# Patient Record
Sex: MALE | Race: BLACK OR AFRICAN AMERICAN | Employment: UNEMPLOYED | URBAN - NONMETROPOLITAN AREA
[De-identification: names, ages, dates, MRNs, and addresses within clinical notes are randomized per-mention and may not be internally consistent; named-entity substitution may affect disease eponyms.]

---

## 2021-08-13 ENCOUNTER — IMMUNIZATION (OUTPATIENT)
Dept: FAMILY MEDICINE | Facility: OTHER | Age: 31
End: 2021-08-13
Attending: FAMILY MEDICINE
Payer: OTHER GOVERNMENT

## 2021-08-13 PROCEDURE — 0001A PR COVID VAC PFIZER DIL RECON 30 MCG/0.3 ML IM: CPT

## 2021-08-13 PROCEDURE — 91300 PR COVID VAC PFIZER DIL RECON 30 MCG/0.3 ML IM: CPT

## 2021-09-03 ENCOUNTER — IMMUNIZATION (OUTPATIENT)
Dept: FAMILY MEDICINE | Facility: OTHER | Age: 31
End: 2021-09-03
Attending: FAMILY MEDICINE
Payer: OTHER GOVERNMENT

## 2021-09-03 PROCEDURE — 0002A PR COVID VAC PFIZER DIL RECON 30 MCG/0.3 ML IM: CPT

## 2021-09-03 PROCEDURE — 91300 PR COVID VAC PFIZER DIL RECON 30 MCG/0.3 ML IM: CPT

## 2023-07-30 ENCOUNTER — HOSPITAL ENCOUNTER (OUTPATIENT)
Dept: GENERAL RADIOLOGY | Facility: OTHER | Age: 33
Discharge: HOME | End: 2023-07-30

## 2023-07-30 DIAGNOSIS — S99.911A ANKLE INJURY, RIGHT, INITIAL ENCOUNTER: ICD-10-CM

## 2023-07-30 PROCEDURE — 73610 X-RAY EXAM OF ANKLE: CPT | Mod: RT

## 2023-07-31 ENCOUNTER — HOSPITAL ENCOUNTER (OUTPATIENT)
Dept: GENERAL RADIOLOGY | Facility: OTHER | Age: 33
Discharge: HOME | End: 2023-07-31
Attending: FAMILY MEDICINE

## 2023-07-31 ENCOUNTER — OFFICE VISIT (OUTPATIENT)
Dept: FAMILY MEDICINE | Facility: OTHER | Age: 33
End: 2023-07-31
Attending: FAMILY MEDICINE

## 2023-07-31 VITALS
SYSTOLIC BLOOD PRESSURE: 130 MMHG | DIASTOLIC BLOOD PRESSURE: 78 MMHG | WEIGHT: 185.19 LBS | HEART RATE: 110 BPM | OXYGEN SATURATION: 98 % | RESPIRATION RATE: 16 BRPM | TEMPERATURE: 98 F

## 2023-07-31 DIAGNOSIS — S82.431A CLOSED DISPLACED OBLIQUE FRACTURE OF SHAFT OF RIGHT FIBULA, INITIAL ENCOUNTER: ICD-10-CM

## 2023-07-31 DIAGNOSIS — S93.431A SYNDESMOTIC DISRUPTION OF RIGHT ANKLE, INITIAL ENCOUNTER: Primary | ICD-10-CM

## 2023-07-31 PROCEDURE — 73590 X-RAY EXAM OF LOWER LEG: CPT | Mod: RT

## 2023-07-31 PROCEDURE — 99214 OFFICE O/P EST MOD 30 MIN: CPT | Performed by: FAMILY MEDICINE

## 2023-07-31 ASSESSMENT — PAIN SCALES - GENERAL: PAINLEVEL: MODERATE PAIN (5)

## 2023-07-31 NOTE — H&P (VIEW-ONLY)
Sports Medicine Office Note    HPI:  33-year-old male coming in for evaluation of a right ankle injury that occurred on 7/29.  He was playing recreational softball with family and friends [place] a [place] and hurt his ankle sliding into second base.  He does not recall the exact mechanism of the injury.  He heard a loud pop when this occurred and had immediate pain and difficulty bearing weight.  He subsequently developed swelling.  He was seen in the ER the next day and was found to have ankle x-ray showing a disrupted mortise.  He has been nonweightbearing in a splint.  He comes in today for follow-up.  He currently rates his pain at 9/10.  He has been using ice to help with pain and swelling.  He has a history of an ankle sprain 14 years ago.      33-year-old male with no significant past medical history coming in for preoperative clearance. They plan to undergo right ankle surgery with Dr. High on 8/3. No history of previous surgical complications. No history of blood clot or PE.  No personal or family history of problems with anesthesia.  No history of coronary artery disease, stroke, congestive heart failure, or kidney disease.  Patient feels like they would be able to walk up multiple flight(s) of stairs before needing to stop.  Patient feels like they would be able to walk many city blocks before needing to stop.    Revised Flannery Cardiac Risk index (RCRI)  6 predictors of cardiac complications:  -High risk type of surgery (examples include vascular surgery and any open intraperitoneal or intrathoracic procedures):  No  -History of ischemic heart disease (history of MI or positive exercise test, current complaint of chest pain considered to be secondary to myocardial ischemia, use of nitrate therapy, or EKG with pathologic Q waves; do not count prior coronary revascularization procedure and less 1 of the other criteria for ischemic heart disease is present):  No  -History of heart failure:   No  -History of cerebrovascular disease:  No  -Diabetes mellitus requiring treatment with insulin:  No  -Preoperative serum creatinine greater than 2.0 mg/dL:  No  30-day risk of death, MI, or cardiac arrest following surgery:  3.9%    Daryl Perioperative Cardiac Risk:  -Age:  33  -Creatinine: <1.5mg/dL  -ASA class:  ASA 1 (Normal healthy patient)  -Perioperative functional status:  Total independent  -Procedure site:  Orthopedic and nonvascular extremity  Estimated risk probability for perioperative myocardial infarction or cardiac arrest:  0.0%    MET evaluation:  -Can take care of self, such as eat, dress, or use the toilet (1 MET)  -Can walk up a flight of stairs or a hill or walk on level ground at 3-4 mph (4 METs)  -Can do heavy work around the house such as scrubbing floors or lifting or moving heavy furniture or climbing 2 flights of stairs (between 4-10 METs)  -Can participate in strenuous sports such as swimming, single tennis, football, basketball, and skiing (greater than 10 METs)  MET score:  10+    Symptoms/history:  -Symptoms of angina, dyspnea, syncope, and/or palpitations:  No  -History of ischemia, vascular disease, myopathic disease:  No  -History of diabetes, chronic kidney disease, CVD, PAD:  No  -NSAID use:  No  -Symptoms of SUNDEEP:  No  -Alcohol misuse: 12 beverages per week  -Illicit drug use:  No  -Smoking:  Yes - several cigarettes per day  -Personal or family history of anesthetic complications:  No    EXAM:  /78 (BP Location: Right arm, Patient Position: Sitting, Cuff Size: Adult Regular)   Pulse 110   Temp 98  F (36.7  C) (Temporal)   Resp 16   Wt 84 kg (185 lb 3 oz)   SpO2 98%   General: No acute distress  Neck: Supple, no lymphadenopathy  CV: Regular rate and rhythm without murmur, pulses intact in the lower extremities  Respiratory: Normal effort, talking in full sentences, no wheezes, rales, or rhonchi, normal respiratory rate  Abdomen: NABS, nontender, no  hepatosplenomegaly  Psych: Normal mood and affect    MUSCULOSKELETAL EXAM:  RIGHT FOOT AND ANKLE  Inspection:  -No gross deformity  - Significant swelling about the lower leg, ankle, and foot  - Mild bruising about the ankle  -Scars: Multiple tattoos throughout the lower extremity    Tenderness:  - Diffusely tender throughout the lower leg, ankle, and proximal foot    Range of Motion:  - Limited due to pain    Strength:  -Dorsiflexion:  4-/5  -Plantarflexion:  4-/5  -Inversion:  3/5  -Eversion:  3/5    Other:  -Intact sensation to light touch distally.  -No signs of cyanosis. Normal skin temperature.  -Knee:  No gross deformity. Normal motion.  -Left foot/ankle:  No gross deformity. No palpable tenderness. Normal strength.      IMAGIN2023: 3 view right ankle x-ray  - Large mortise disruption of the right ankle.  Small osseous fragment resting between the medial malleolus and the medial talus which likely represents a small avulsion injury.  No other acute bony abnormalities.    2023: 2 view right tibia/fibula x-ray  - Mildly displaced midshaft fibula fracture.  Mortise disruption is again seen at the ankle.      ASSESSMENT/PLAN:  Diagnoses and all orders for this visit:  Syndesmotic disruption of right ankle, initial encounter  -     Crutches Order for DME - ONLY FOR DME  -     WALKING BOOT - SUMMER  Closed displaced oblique fracture of shaft of right fibula, initial encounter  -     Crutches Order for DME - ONLY FOR DME  -     WALKING BOOT - SUMMER    33-year-old male with right ankle mortise disruption and a Maisonneuve fracture.  X-rays from  and  were both personally reviewed in the office with the findings as demonstrated above by my interpretation.  This is an injury pattern that necessitates surgical intervention.  This was explained in great detail with the patient and mother-in-law who was present during the visit.  - Walking boot  - Patient to be nonweightbearing on crutches  - Preop  physical performed in the office today (see below)  - Arrange for patient to have surgery on 8/3 with Dr. High  - Orthopedics to assume care      33-year-old male coming in for preoperative clearance for right ankle surgery with Dr. High on 8/3.  No indication for labs, EKG, and chest x-ray.  Patient has a normal exam.  RCRI risk assessment of 3.9% and Brito Perioperative Cardiac Risk assessment of 0.0%.  Based on my evaluation today, I feel this patient is at low risk for complications from orthopedic surgery.  -Follow-up with Dr. High as planned prior to the procedure to go over final details of the procedure  -Medications to stop and the length of time for them to be stopped prior to surgery will be determined by Dr. High/surgery team.      Lewis Gann MD  7/31/2023  11:15 AM    Total time spent with this patient was 46 minutes which included chart review, visualization and independent interpretation of images, time spent with the patient, and documentation.    Procedure time:  0 minute(s)

## 2023-08-02 ENCOUNTER — ANESTHESIA EVENT (OUTPATIENT)
Dept: SURGERY | Facility: OTHER | Age: 33
End: 2023-08-02

## 2023-08-03 ENCOUNTER — HOSPITAL ENCOUNTER (OUTPATIENT)
Facility: OTHER | Age: 33
Discharge: HOME | End: 2023-08-03
Attending: PODIATRIST | Admitting: PODIATRIST

## 2023-08-03 ENCOUNTER — ANESTHESIA (OUTPATIENT)
Dept: SURGERY | Facility: OTHER | Age: 33
End: 2023-08-03

## 2023-08-03 ENCOUNTER — HOSPITAL ENCOUNTER (OUTPATIENT)
Dept: GENERAL RADIOLOGY | Facility: OTHER | Age: 33
Discharge: HOME | End: 2023-08-03
Attending: PODIATRIST | Admitting: PODIATRIST

## 2023-08-03 VITALS
HEART RATE: 66 BPM | SYSTOLIC BLOOD PRESSURE: 108 MMHG | TEMPERATURE: 98.5 F | OXYGEN SATURATION: 97 % | RESPIRATION RATE: 16 BRPM | BODY MASS INDEX: 30.82 KG/M2 | WEIGHT: 185 LBS | DIASTOLIC BLOOD PRESSURE: 89 MMHG | HEIGHT: 65 IN

## 2023-08-03 DIAGNOSIS — G89.18 POST-OP PAIN: Primary | ICD-10-CM

## 2023-08-03 DIAGNOSIS — S93.431A SYNDESMOTIC DISRUPTION OF RIGHT ANKLE, INITIAL ENCOUNTER: ICD-10-CM

## 2023-08-03 PROCEDURE — 360N000084 HC SURGERY LEVEL 4 W/ FLUORO, PER MIN: Performed by: PODIATRIST

## 2023-08-03 PROCEDURE — 272N000001 HC OR GENERAL SUPPLY STERILE: Performed by: PODIATRIST

## 2023-08-03 PROCEDURE — 27784 TREATMENT OF FIBULA FRACTURE: CPT | Mod: RT | Performed by: PODIATRIST

## 2023-08-03 PROCEDURE — 250N000009 HC RX 250: Performed by: NURSE ANESTHETIST, CERTIFIED REGISTERED

## 2023-08-03 PROCEDURE — 250N000011 HC RX IP 250 OP 636: Performed by: NURSE ANESTHETIST, CERTIFIED REGISTERED

## 2023-08-03 PROCEDURE — 258N000003 HC RX IP 258 OP 636

## 2023-08-03 PROCEDURE — 250N000011 HC RX IP 250 OP 636: Mod: JZ | Performed by: NURSE ANESTHETIST, CERTIFIED REGISTERED

## 2023-08-03 PROCEDURE — 250N000011 HC RX IP 250 OP 636

## 2023-08-03 PROCEDURE — 710N000012 HC RECOVERY PHASE 2, PER MINUTE: Performed by: PODIATRIST

## 2023-08-03 PROCEDURE — 278N000051 HC OR IMPLANT GENERAL: Performed by: PODIATRIST

## 2023-08-03 PROCEDURE — 27829 TREAT LOWER LEG JOINT: CPT | Mod: RT | Performed by: PODIATRIST

## 2023-08-03 PROCEDURE — 999N000141 HC STATISTIC PRE-PROCEDURE NURSING ASSESSMENT: Performed by: PODIATRIST

## 2023-08-03 PROCEDURE — 999N000180 XR SURGERY CARM FLUORO LESS THAN 5 MIN: Mod: TC

## 2023-08-03 PROCEDURE — 250N000013 HC RX MED GY IP 250 OP 250 PS 637

## 2023-08-03 PROCEDURE — 250N000011 HC RX IP 250 OP 636: Performed by: PODIATRIST

## 2023-08-03 PROCEDURE — 250N000009 HC RX 250: Performed by: PODIATRIST

## 2023-08-03 PROCEDURE — 64445 NJX AA&/STRD SCIATIC NRV IMG: CPT | Mod: RT | Performed by: NURSE ANESTHETIST, CERTIFIED REGISTERED

## 2023-08-03 PROCEDURE — 370N000017 HC ANESTHESIA TECHNICAL FEE, PER MIN: Performed by: PODIATRIST

## 2023-08-03 PROCEDURE — 250N000013 HC RX MED GY IP 250 OP 250 PS 637: Performed by: NURSE ANESTHETIST, CERTIFIED REGISTERED

## 2023-08-03 PROCEDURE — C1713 ANCHOR/SCREW BN/BN,TIS/BN: HCPCS | Performed by: PODIATRIST

## 2023-08-03 PROCEDURE — 64447 NJX AA&/STRD FEMORAL NRV IMG: CPT | Mod: RT | Performed by: NURSE ANESTHETIST, CERTIFIED REGISTERED

## 2023-08-03 DEVICE — IMPLANTABLE DEVICE: Type: IMPLANTABLE DEVICE | Site: ANKLE | Status: FUNCTIONAL

## 2023-08-03 DEVICE — IMP PLATE ARTHREX LOCKING 1/3 TUBULAR 10H SS AR-8943T-10: Type: IMPLANTABLE DEVICE | Site: ANKLE | Status: FUNCTIONAL

## 2023-08-03 RX ORDER — SODIUM CHLORIDE, SODIUM LACTATE, POTASSIUM CHLORIDE, CALCIUM CHLORIDE 600; 310; 30; 20 MG/100ML; MG/100ML; MG/100ML; MG/100ML
INJECTION, SOLUTION INTRAVENOUS CONTINUOUS
Status: DISCONTINUED | OUTPATIENT
Start: 2023-08-03 | End: 2023-08-03 | Stop reason: HOSPADM

## 2023-08-03 RX ORDER — ACETAMINOPHEN 325 MG/1
1000 TABLET ORAL EVERY 8 HOURS
Qty: 45 TABLET | Refills: 0 | Status: SHIPPED | OUTPATIENT
Start: 2023-08-03 | End: 2023-08-08

## 2023-08-03 RX ORDER — NALOXONE HYDROCHLORIDE 0.4 MG/ML
0.4 INJECTION, SOLUTION INTRAMUSCULAR; INTRAVENOUS; SUBCUTANEOUS
Status: DISCONTINUED | OUTPATIENT
Start: 2023-08-03 | End: 2023-08-03 | Stop reason: HOSPADM

## 2023-08-03 RX ORDER — FENTANYL CITRATE 50 UG/ML
25 INJECTION, SOLUTION INTRAMUSCULAR; INTRAVENOUS
Status: DISCONTINUED | OUTPATIENT
Start: 2023-08-03 | End: 2023-08-03 | Stop reason: HOSPADM

## 2023-08-03 RX ORDER — NALOXONE HYDROCHLORIDE 0.4 MG/ML
0.2 INJECTION, SOLUTION INTRAMUSCULAR; INTRAVENOUS; SUBCUTANEOUS
Status: DISCONTINUED | OUTPATIENT
Start: 2023-08-03 | End: 2023-08-03 | Stop reason: HOSPADM

## 2023-08-03 RX ORDER — DEXAMETHASONE SODIUM PHOSPHATE 10 MG/ML
INJECTION, SOLUTION INTRAMUSCULAR; INTRAVENOUS
Status: COMPLETED | OUTPATIENT
Start: 2023-08-03 | End: 2023-08-03

## 2023-08-03 RX ORDER — HYDROMORPHONE HCL IN WATER/PF 6 MG/30 ML
0.2 PATIENT CONTROLLED ANALGESIA SYRINGE INTRAVENOUS EVERY 5 MIN PRN
Status: DISCONTINUED | OUTPATIENT
Start: 2023-08-03 | End: 2023-08-03 | Stop reason: HOSPADM

## 2023-08-03 RX ORDER — KETOROLAC TROMETHAMINE 30 MG/ML
30 INJECTION, SOLUTION INTRAMUSCULAR; INTRAVENOUS ONCE
Status: COMPLETED | OUTPATIENT
Start: 2023-08-03 | End: 2023-08-03

## 2023-08-03 RX ORDER — ONDANSETRON 4 MG/1
4 TABLET, ORALLY DISINTEGRATING ORAL EVERY 30 MIN PRN
Status: DISCONTINUED | OUTPATIENT
Start: 2023-08-03 | End: 2023-08-03 | Stop reason: HOSPADM

## 2023-08-03 RX ORDER — OXYCODONE HYDROCHLORIDE 5 MG/1
5 TABLET ORAL
Status: DISCONTINUED | OUTPATIENT
Start: 2023-08-03 | End: 2023-08-03

## 2023-08-03 RX ORDER — BUPIVACAINE HYDROCHLORIDE 5 MG/ML
INJECTION, SOLUTION EPIDURAL; INTRACAUDAL
Status: COMPLETED | OUTPATIENT
Start: 2023-08-03 | End: 2023-08-03

## 2023-08-03 RX ORDER — ACETAMINOPHEN 500 MG
500-1000 TABLET ORAL EVERY 6 HOURS PRN
Status: ON HOLD | COMMUNITY
End: 2023-08-03

## 2023-08-03 RX ORDER — OXYCODONE HYDROCHLORIDE 5 MG/1
5 TABLET ORAL EVERY 6 HOURS PRN
Qty: 20 TABLET | Refills: 0 | Status: SHIPPED | OUTPATIENT
Start: 2023-08-03

## 2023-08-03 RX ORDER — ONDANSETRON 2 MG/ML
4 INJECTION INTRAMUSCULAR; INTRAVENOUS EVERY 30 MIN PRN
Status: DISCONTINUED | OUTPATIENT
Start: 2023-08-03 | End: 2023-08-03 | Stop reason: HOSPADM

## 2023-08-03 RX ORDER — CLINDAMYCIN PHOSPHATE 900 MG/50ML
900 INJECTION, SOLUTION INTRAVENOUS
Status: COMPLETED | OUTPATIENT
Start: 2023-08-03 | End: 2023-08-03

## 2023-08-03 RX ORDER — CLINDAMYCIN PHOSPHATE 900 MG/50ML
900 INJECTION, SOLUTION INTRAVENOUS SEE ADMIN INSTRUCTIONS
Status: DISCONTINUED | OUTPATIENT
Start: 2023-08-03 | End: 2023-08-03 | Stop reason: HOSPADM

## 2023-08-03 RX ORDER — FENTANYL CITRATE 50 UG/ML
25-50 INJECTION, SOLUTION INTRAMUSCULAR; INTRAVENOUS
Status: DISCONTINUED | OUTPATIENT
Start: 2023-08-03 | End: 2023-08-03 | Stop reason: HOSPADM

## 2023-08-03 RX ORDER — HYDROXYZINE HYDROCHLORIDE 10 MG/1
20 TABLET, FILM COATED ORAL
Status: DISCONTINUED | OUTPATIENT
Start: 2023-08-03 | End: 2023-08-03 | Stop reason: HOSPADM

## 2023-08-03 RX ORDER — FLUMAZENIL 0.1 MG/ML
0.2 INJECTION, SOLUTION INTRAVENOUS
Status: DISCONTINUED | OUTPATIENT
Start: 2023-08-03 | End: 2023-08-03 | Stop reason: HOSPADM

## 2023-08-03 RX ORDER — HYDROMORPHONE HCL IN WATER/PF 6 MG/30 ML
0.4 PATIENT CONTROLLED ANALGESIA SYRINGE INTRAVENOUS EVERY 5 MIN PRN
Status: DISCONTINUED | OUTPATIENT
Start: 2023-08-03 | End: 2023-08-03 | Stop reason: HOSPADM

## 2023-08-03 RX ORDER — BUPIVACAINE HYDROCHLORIDE 5 MG/ML
INJECTION, SOLUTION EPIDURAL; INTRACAUDAL PRN
Status: DISCONTINUED | OUTPATIENT
Start: 2023-08-03 | End: 2023-08-03

## 2023-08-03 RX ORDER — FENTANYL CITRATE 50 UG/ML
INJECTION, SOLUTION INTRAMUSCULAR; INTRAVENOUS PRN
Status: DISCONTINUED | OUTPATIENT
Start: 2023-08-03 | End: 2023-08-03

## 2023-08-03 RX ORDER — LIDOCAINE 40 MG/G
CREAM TOPICAL
Status: DISCONTINUED | OUTPATIENT
Start: 2023-08-03 | End: 2023-08-03 | Stop reason: HOSPADM

## 2023-08-03 RX ORDER — ONDANSETRON 2 MG/ML
INJECTION INTRAMUSCULAR; INTRAVENOUS PRN
Status: DISCONTINUED | OUTPATIENT
Start: 2023-08-03 | End: 2023-08-03

## 2023-08-03 RX ORDER — LIDOCAINE HYDROCHLORIDE 20 MG/ML
INJECTION, SOLUTION INFILTRATION; PERINEURAL PRN
Status: DISCONTINUED | OUTPATIENT
Start: 2023-08-03 | End: 2023-08-03

## 2023-08-03 RX ORDER — PROPOFOL 10 MG/ML
INJECTION, EMULSION INTRAVENOUS CONTINUOUS PRN
Status: DISCONTINUED | OUTPATIENT
Start: 2023-08-03 | End: 2023-08-03

## 2023-08-03 RX ORDER — MAGNESIUM HYDROXIDE 1200 MG/15ML
LIQUID ORAL PRN
Status: DISCONTINUED | OUTPATIENT
Start: 2023-08-03 | End: 2023-08-03 | Stop reason: HOSPADM

## 2023-08-03 RX ORDER — PROPOFOL 10 MG/ML
INJECTION, EMULSION INTRAVENOUS PRN
Status: DISCONTINUED | OUTPATIENT
Start: 2023-08-03 | End: 2023-08-03

## 2023-08-03 RX ORDER — FENTANYL CITRATE 50 UG/ML
50 INJECTION, SOLUTION INTRAMUSCULAR; INTRAVENOUS EVERY 5 MIN PRN
Status: DISCONTINUED | OUTPATIENT
Start: 2023-08-03 | End: 2023-08-03 | Stop reason: HOSPADM

## 2023-08-03 RX ORDER — OXYCODONE HYDROCHLORIDE 5 MG/1
5 TABLET ORAL
Status: COMPLETED | OUTPATIENT
Start: 2023-08-03 | End: 2023-08-03

## 2023-08-03 RX ORDER — ONDANSETRON 4 MG/1
4 TABLET, ORALLY DISINTEGRATING ORAL
Status: DISCONTINUED | OUTPATIENT
Start: 2023-08-03 | End: 2023-08-03

## 2023-08-03 RX ORDER — ACETAMINOPHEN 325 MG/1
975 TABLET ORAL ONCE
Status: COMPLETED | OUTPATIENT
Start: 2023-08-03 | End: 2023-08-03

## 2023-08-03 RX ORDER — FENTANYL CITRATE 50 UG/ML
25 INJECTION, SOLUTION INTRAMUSCULAR; INTRAVENOUS EVERY 5 MIN PRN
Status: DISCONTINUED | OUTPATIENT
Start: 2023-08-03 | End: 2023-08-03 | Stop reason: HOSPADM

## 2023-08-03 RX ORDER — OXYCODONE HYDROCHLORIDE 5 MG/1
10 TABLET ORAL
Status: DISCONTINUED | OUTPATIENT
Start: 2023-08-03 | End: 2023-08-03 | Stop reason: HOSPADM

## 2023-08-03 RX ORDER — KETAMINE HYDROCHLORIDE 10 MG/ML
INJECTION INTRAMUSCULAR; INTRAVENOUS PRN
Status: DISCONTINUED | OUTPATIENT
Start: 2023-08-03 | End: 2023-08-03

## 2023-08-03 RX ADMIN — FENTANYL CITRATE 25 MCG: 50 INJECTION, SOLUTION INTRAMUSCULAR; INTRAVENOUS at 14:29

## 2023-08-03 RX ADMIN — ACETAMINOPHEN 975 MG: 325 TABLET, FILM COATED ORAL at 14:53

## 2023-08-03 RX ADMIN — FENTANYL CITRATE 50 MCG: 50 INJECTION, SOLUTION INTRAMUSCULAR; INTRAVENOUS at 13:28

## 2023-08-03 RX ADMIN — PROPOFOL 100 MG: 10 INJECTION, EMULSION INTRAVENOUS at 12:42

## 2023-08-03 RX ADMIN — HYDROMORPHONE HYDROCHLORIDE 0.4 MG: 0.2 INJECTION, SOLUTION INTRAMUSCULAR; INTRAVENOUS; SUBCUTANEOUS at 14:38

## 2023-08-03 RX ADMIN — PROPOFOL 125 MCG/KG/MIN: 10 INJECTION, EMULSION INTRAVENOUS at 12:42

## 2023-08-03 RX ADMIN — OXYCODONE HYDROCHLORIDE 5 MG: 5 TABLET ORAL at 14:53

## 2023-08-03 RX ADMIN — BUPIVACAINE HYDROCHLORIDE 10 ML: 5 INJECTION, SOLUTION EPIDURAL; INTRACAUDAL at 15:12

## 2023-08-03 RX ADMIN — Medication 10 MG: at 13:07

## 2023-08-03 RX ADMIN — FENTANYL CITRATE 25 MCG: 50 INJECTION, SOLUTION INTRAMUSCULAR; INTRAVENOUS at 14:44

## 2023-08-03 RX ADMIN — SODIUM CHLORIDE, SODIUM LACTATE, POTASSIUM CHLORIDE, AND CALCIUM CHLORIDE: 600; 310; 30; 20 INJECTION, SOLUTION INTRAVENOUS at 11:25

## 2023-08-03 RX ADMIN — KETOROLAC TROMETHAMINE 30 MG: 30 INJECTION, SOLUTION INTRAMUSCULAR; INTRAVENOUS at 14:52

## 2023-08-03 RX ADMIN — BUPIVACAINE HYDROCHLORIDE 15 ML: 5 INJECTION, SOLUTION EPIDURAL; INTRACAUDAL; PERINEURAL at 11:58

## 2023-08-03 RX ADMIN — DEXAMETHASONE SODIUM PHOSPHATE 5 MG: 10 INJECTION, SOLUTION INTRAMUSCULAR; INTRAVENOUS at 11:58

## 2023-08-03 RX ADMIN — FENTANYL CITRATE 50 MCG: 50 INJECTION, SOLUTION INTRAMUSCULAR; INTRAVENOUS at 13:47

## 2023-08-03 RX ADMIN — LIDOCAINE HYDROCHLORIDE 40 MG: 20 INJECTION, SOLUTION INFILTRATION; PERINEURAL at 12:42

## 2023-08-03 RX ADMIN — BUPIVACAINE HYDROCHLORIDE 15 ML: 5 INJECTION, SOLUTION EPIDURAL; INTRACAUDAL at 12:02

## 2023-08-03 RX ADMIN — FENTANYL CITRATE 100 MCG: 50 INJECTION, SOLUTION INTRAMUSCULAR; INTRAVENOUS at 13:06

## 2023-08-03 RX ADMIN — MIDAZOLAM 4 MG: 1 INJECTION INTRAMUSCULAR; INTRAVENOUS at 11:49

## 2023-08-03 RX ADMIN — CLINDAMYCIN PHOSPHATE 900 MG: 900 INJECTION, SOLUTION INTRAVENOUS at 12:33

## 2023-08-03 RX ADMIN — Medication 30 MG: at 13:02

## 2023-08-03 RX ADMIN — DEXAMETHASONE SODIUM PHOSPHATE 5 MG: 10 INJECTION, SOLUTION INTRAMUSCULAR; INTRAVENOUS at 12:02

## 2023-08-03 RX ADMIN — Medication 10 MG: at 12:56

## 2023-08-03 RX ADMIN — MIDAZOLAM HYDROCHLORIDE 2 MG: 1 INJECTION, SOLUTION INTRAMUSCULAR; INTRAVENOUS at 12:41

## 2023-08-03 RX ADMIN — ONDANSETRON HYDROCHLORIDE 4 MG: 2 SOLUTION INTRAMUSCULAR; INTRAVENOUS at 12:42

## 2023-08-03 RX ADMIN — SODIUM CHLORIDE, SODIUM LACTATE, POTASSIUM CHLORIDE, AND CALCIUM CHLORIDE: 600; 310; 30; 20 INJECTION, SOLUTION INTRAVENOUS at 12:58

## 2023-08-03 RX ADMIN — FENTANYL CITRATE 50 MCG: 50 INJECTION, SOLUTION INTRAMUSCULAR; INTRAVENOUS at 13:19

## 2023-08-03 ASSESSMENT — ACTIVITIES OF DAILY LIVING (ADL)
ADLS_ACUITY_SCORE: 35

## 2023-08-03 ASSESSMENT — LIFESTYLE VARIABLES: TOBACCO_USE: 1

## 2023-08-03 NOTE — ANESTHESIA PREPROCEDURE EVALUATION
Anesthesia Pre-Procedure Evaluation    Patient: Viridiana Cee   MRN: [id number] : 1990        Procedure : Procedure(s):  Open reduction internal fixation ankle Syndesmosis          History reviewed. No pertinent past medical history.   History reviewed. No pertinent surgical history.   Allergies   Allergen Reactions    Penicillins       Social History     Tobacco Use    Smoking status: Every Day     Types: Cigarettes    Smokeless tobacco: Not on file   Substance Use Topics    Alcohol use: Yes      Wt Readings from Last 1 Encounters:   23 83.9 kg (185 lb)        Anesthesia Evaluation   Pt has not had prior anesthetic         ROS/MED HX  ENT/Pulmonary:     (+)                tobacco use, Current use,                      Neurologic:  - neg neurologic ROS     Cardiovascular:  - neg cardiovascular ROS     METS/Exercise Tolerance: >4 METS    Hematologic:  - neg hematologic  ROS     Musculoskeletal:  - neg musculoskeletal ROS     GI/Hepatic:  - neg GI/hepatic ROS     Renal/Genitourinary:  - neg Renal ROS     Endo:  - neg endo ROS     Psychiatric/Substance Use:  - neg psychiatric ROS     Infectious Disease:  - neg infectious disease ROS     Malignancy:  - neg malignancy ROS     Other:            Physical Exam    Airway        Mallampati: I   TM distance: > 3 FB   Neck ROM: full   Mouth opening: > 3 cm    Respiratory Devices and Support         Dental       (+) Completely normal teeth      Cardiovascular   cardiovascular exam normal          Pulmonary   pulmonary exam normal                OUTSIDE LABS:  CBC: No results found for: WBC, HGB, HCT, PLT  BMP: No results found for: NA, POTASSIUM, CHLORIDE, CO2, BUN, CR, GLC  COAGS: No results found for: PTT, INR, FIBR  POC: No results found for: BGM, HCG, HCGS  HEPATIC: No results found for: ALBUMIN, PROTTOTAL, ALT, AST, GGT, ALKPHOS, BILITOTAL, BILIDIRECT, JACQUE  OTHER: No results found for: PH, LACT, A1C, SUNIL, PHOS, MAG, LIPASE, AMYLASE, TSH, T4, T3,  CRP, SED    Anesthesia Plan    ASA Status:  1    NPO Status:  NPO Appropriate    Anesthesia Type: MAC (agrees to GA if needed LMA ok).              Consents    Anesthesia Plan(s) and associated risks, benefits, and realistic alternatives discussed. Questions answered and patient/representative(s) expressed understanding.     - Discussed: Risks, Benefits and Alternatives for BOTH SEDATION and the PROCEDURE were discussed     - Discussed with:  Patient      - Extended Intubation/Ventilatory Support Discussed: No.      - Patient is DNR/DNI Status: No     Use of blood products discussed: No .     Postoperative Care    Pain management: IV analgesics, Multi-modal analgesia, Peripheral nerve block (Single Shot) (Adductor canal and popliteal nerve block).   PONV prophylaxis: Ondansetron (or other 5HT-3), Dexamethasone or Solumedrol     Comments:                MARIANA FRANCIS CRNA

## 2023-08-03 NOTE — ANESTHESIA PROCEDURE NOTES
Sciatic Procedure Note    Pre-Procedure   Staff -        CRNA: Jack Celis APRN CRNA       Performed By: CRNA       Location: post-op       Procedure Start/Stop Times: 8/3/2023 3:06 PM and 8/3/2023 3:13 PM       Pre-Anesthestic Checklist: patient identified, IV checked, site marked, risks and benefits discussed, informed consent, monitors and equipment checked, pre-op evaluation, at physician/surgeon's request and post-op pain management  Timeout:       Correct Patient: Yes        Correct Procedure: Yes        Correct Site: Yes        Correct Position: Yes        Correct Laterality: Yes        Site Marked: Yes  Procedure Documentation  Procedure: Sciatic       Diagnosis: POST-OP PAIN CONTROL       Laterality: right       Patient Position: supine       Patient Prep/Sterile Barriers: sterile gloves, mask       Skin prep: Chloraprep (popliteal approach).       Needle Type: insulated       Needle Gauge: 20.        Needle Length (Inches): 4        Ultrasound guided       1. Ultrasound was used to identify targeted nerve, plexus, vascular marker, or fascial plane and place a needle adjacent to it in real-time.       2. Ultrasound was used to visualize the spread of anesthetic in close proximity to the above referenced structure.       3. A permanent image is entered into the patient's record.       4. The visualized anatomic structures appeared normal.       5. There were no apparent abnormal pathologic findings.    Assessment/Narrative         The placement was positive for bleeding at site.       The placement was negative for: blood aspirated and painful injection       Paresthesias: Yes and Resolved.       Bolus given via needle..        Secured via.        Insertion/Infusion Method: Single Shot       Complications: none    Medication(s) Administered   Medication Administration Time: 8/3/2023 3:06 PM      FOR [facility] ([facility]) ONLY:   Pain Team Contact information: please page the Pain Team Via [employer]. Search  "\"Pain\". During daytime hours, please page the attending first. At night please page the resident first.      "

## 2023-08-03 NOTE — OR NURSING
Patient has been discharged to home at 1545 via wheelchair accompanied by family    Written discharge instructions were provided to patient.  Prescriptions were escribed to [facility] pharmacy for .  Patient states their pain is 2/10, and denies any nausea or dizziness upon discharge.    Patient and adult caring for them verbalize understanding of discharge instructions including no driving until tomorrow and no longer taking narcotic pain medications - no operating mechanical equipment and no making any important decisions.They understand reason for discharge, and necessary follow-up appointments.       Saba Howard RN

## 2023-08-03 NOTE — ANESTHESIA POSTPROCEDURE EVALUATION
Patient: Viridiana Cee    Procedure: Procedure(s):  Open reduction internal fixation ankle Syndesmosis AND FIBULA       Anesthesia Type:  MAC    Note:  Disposition: Outpatient   Postop Pain Control: Uneventful            Sign Out: Well controlled pain   PONV: No   Neuro/Psych: Uneventful            Sign Out: Acceptable/Baseline neuro status   Airway/Respiratory: Uneventful            Sign Out: Acceptable/Baseline resp. status   CV/Hemodynamics: Uneventful            Sign Out: Acceptable CV status; No obvious hypovolemia; No obvious fluid overload   Other NRE: NONE   DID A NON-ROUTINE EVENT OCCUR? No           Last vitals:  Vitals Value Taken Time   /94 08/03/23 1500   Temp     Pulse 54 08/03/23 1500   Resp     SpO2 100 % 08/03/23 1501   Vitals shown include unvalidated device data.    Electronically Signed By: MARIANA ALTAMIRANO CRNA  August 3, 2023  3:03 PM

## 2023-08-03 NOTE — BRIEF OP NOTE
[facility]    Brief Operative Note    Pre-operative diagnosis: Right ankle injury [S99.911A]  Post-operative diagnosis Same as pre-operative diagnosis    Procedure: Procedure(s):  Open reduction internal fixation ankle Syndesmosis AND FIBULA  Surgeon: Surgeon(s) and Role:     * Justin High DPM - Primary     * Hayes Ochoa PA - Assisting  Anesthesia: MAC with Block   Estimated Blood Loss: Minimal    Drains: None  Specimens: * No specimens in log *  Findings:   None.  Complications: None.  Implants:   Implant Name Type Inv. Item Serial No.  Lot No. LRB No. Used Action   SYNDESMOSIS TIGHT ROPE PLATE IMPLANT SYSTEM    ARTHREX  Right 1 Implanted   IMP SCR ARTHREX LP JACOB TM 3.5X14MM SS AR-8835-14 - [id number] Metallic Hardware/[place] IMP SCR ARTHREX LP JACOB TM 3.5X14MM SS AR-8835-14  ARTHREX  Right 1 Implanted   IMP PLATE ARTHREX LOCKING 1/3 TUBULAR 10H SS AR-8943T-10 - [id number] Metallic Hardware/[place] IMP PLATE ARTHREX LOCKING 1/3 TUBULAR 10H SS [id number]  ARTHREX  Right 1 Implanted   IMP SCR ARTHREX LP JACOB TM 3.5X18MM SS AR-8835-18 - [id number] Metallic Hardware/[place] IMP SCR ARTHREX LP JACOB TM 3.5X18MM SS AR-8835-18  ARTHREX  Right 1 Implanted and Explanted   IMP SCR ARTHREX LP JACOB TM 3.5X16MM SS AR-8835-16 - [id number] Metallic Hardware/[place] IMP SCR ARTHREX LP JACOB TM 3.5X16MM SS AR-8835-16  ARTHREX  Right 1 Implanted   IMP SCR ARTHREX LP JACOB TM 3.5X14MM SS AR-8835-14 - [id number] Metallic Hardware/[place] IMP SCR ARTHREX LP JACOB TM 3.5X14MM SS AR-8835-14  ARTHREX  Right 1 Implanted and Explanted   IMP SCR ARTHREX LP JACOB TM 3.5X20MM SS AR-8835-20 - [id number] Metallic Hardware/[place] IMP SCR ARTHREX LP JACOB TM 3.5X20MM SS AR-8835-20  ARTHREX  Right 1 Implanted   IMP SCR ARTHREX LP JACOB TM 3.5X18MM SS AR-8835-18 - [id number] Metallic Hardware/[place] IMP SCR ARTHREX LP JACOB TM 3.5X18MM SS AR-8835-18  ARTHREX  Right 2 Implanted

## 2023-08-03 NOTE — ANESTHESIA CARE TRANSFER NOTE
Patient: Viridiana Cee    Procedure: Procedure(s):  Open reduction internal fixation ankle Syndesmosis AND FIBULA       Diagnosis: Right ankle injury [S99.911A]  Diagnosis Additional Information: No value filed.    Anesthesia Type:   MAC     Note:    Oropharynx: oropharynx clear of all foreign objects  Level of Consciousness: awake  Oxygen Supplementation: room air    Independent Airway: airway patency satisfactory and stable  Dentition: dentition unchanged  Vital Signs Stable: post-procedure vital signs reviewed and stable  Report to RN Given: handoff report given  Patient transferred to: Phase II    Handoff Report: Identifed the Patient, Identified the Reponsible Provider, Reviewed the pertinent medical history, Discussed the surgical course, Reviewed Intra-OP anesthesia mangement and issues during anesthesia, Set expectations for post-procedure period and Allowed opportunity for questions and acknowledgement of understanding      Vitals:  Vitals Value Taken Time   /94 08/03/23 1500   Temp     Pulse 54 08/03/23 1500   Resp     SpO2 100 % 08/03/23 1501   Vitals shown include unvalidated device data.    Electronically Signed By: MARIANA ALTAMIRANO CRNA  August 3, 2023  3:03 PM

## 2023-08-03 NOTE — OP NOTE
SURGEON:  Justin High DPM.     ASSISTANT:  Hayes Ochoa PA-C.      A skilled first assistant was necessary due to technical complexity of the procedure and for the patient's safety.  The assistant helped with positioning, retraction, visualization of the operative field and moreover helped to complete the procedure in a technically safe and efficient manner.       PREOPERATIVE DIAGNOSIS:   Right ankle fracture with a Garcia C and significant syndesmotic and medial instability      POSTOPERATIVE DIAGNOSIS:   Same as preop diagnosis     PROCEDURE:    ORIF fibula  ORIF syndesmosis     ANESTHESIA: MAC with regional     HEMOSTASIS: Thigh tourniquet electrocautery.     ESTIMATED BLOOD LOSS: 30 mL    INDICATIONS FOR PROCEDURE: Patient sustained unstable ankle fracture like to proceed with surgery after detailed discussion today of surgery, recovery, risks, potential complications, alternatives and benefits.    MATERIALS: Arthrex buttress plate with 2 tight ropes, Arthrex 10 hole one third tubular plate with 3 oh cortical screws     PROCEDURE: Supine positioning utilized MAC anesthesia regional block performed.  Right lower extremity prepped and draped in usual fashion.  Thigh tourniquet utilized for portion of the case.    Attention directed to the distal fibula distal fibula exposed to just proximal tibial talar joint for syndesmotic fixation.  Buttress plate temporarily secured to the posterior lateral fibula with an olive tip pen.  Drilling across the plate to the medial cortex of the tibia performed to tight ropes applied ankle held neutral position they were tightened down met a manually to avoid over tightening.  Syndesmosis was stable after this fixation fibula sat nicely within the tibial incisura.  Proximal imaging then demonstrated significant displacement of the midshaft fibular fracture as we did open up proximally at this point to expose the fracture which was along butterflied fragment significant posterior  displacement of the butterfly portion anatomic reduction achieved and this was buttressed in place with a 10 hole one third tubular plate with cortical screws.  Further imaging revealed good reduction and good alignment.  Flushed with saline deep tissue repaired with 2-0 Vicryl skin with skin staples.  Patient placed in a sterile posterior sugar-tong splint.  He will be nonweightbearing and follow-up as scheduled.    Tolerated procedure and anesthesia well transported to PACU with vital signs stable neurovascular status intact.      Justin High DPM  8/3/2023

## 2023-08-03 NOTE — ANESTHESIA PROCEDURE NOTES
Adductor canal Procedure Note    Pre-Procedure   Staff -        CRNA: Virginia Miles APRN CRNA       Performed By: CRNA       Procedure Start/Stop Times: 8/3/2023 11:58 AM and 8/3/2023 12:03 PM       Pre-Anesthestic Checklist: patient identified, IV checked, site marked, risks and benefits discussed, informed consent, monitors and equipment checked, pre-op evaluation, at physician/surgeon's request and post-op pain management  Timeout:       Correct Patient: Yes        Correct Procedure: Yes        Correct Site: Yes        Correct Position: Yes        Correct Laterality: Yes        Site Marked: Yes  Procedure Documentation  Procedure: Adductor canal       Diagnosis: RIGHT ANKLE FRACTURE       Laterality: right       Patient Position: supine       Patient Prep/Sterile Barriers: sterile gloves, mask       Skin prep: Chloraprep       Needle Type: insulated       Needle Gauge: 22.        Needle Length (Inches): 4        Ultrasound guided       1. Ultrasound was used to identify targeted nerve, plexus, vascular marker, or fascial plane and place a needle adjacent to it in real-time.       2. Ultrasound was used to visualize the spread of anesthetic in close proximity to the above referenced structure.       3. A permanent image is entered into the patient's record.       4. The visualized anatomic structures appeared normal.       5. There were no apparent abnormal pathologic findings.    Assessment/Narrative         The placement was negative for: painful injection and site bleeding       Paresthesias: No.       Bolus given via needle..        Secured via.        Insertion/Infusion Method: Single Shot       Complications: none       Injection made incrementally with aspirations every 5 mL.    Medication(s) Administered   Bupivacaine 0.5% PF (Infiltration) - Infiltration   15 mL - 8/3/2023 12:02:00 PM  Dexamethasone 10 mg/mL PF (Perineural) - Perineural   5 mg - 8/3/2023 12:02:00 PM  Medication Administration Time:  "8/3/2023 11:58 AM     Comments:  5ml normal saline added to mixture      FOR [facility] ([facility]) ONLY:   Pain Team Contact information: please page the Pain Team Via [employer]. Search \"Pain\". During daytime hours, please page the attending first. At night please page the resident first.      "

## 2023-08-03 NOTE — DISCHARGE INSTRUCTIONS
Surgeon Contact Information  If you have questions or concerns related to your procedure please contact your surgeon through [facility] at [phone].     [facility]  Adult Discharge Orders & Instructions      For 24 hours after surgery:  Get plenty of rest.  A responsible adult must stay with you for at least 24 hours after you leave the hospital.   You may feel lightheaded.  IF so, sit for a few minutes before standing.  Have someone help you get up.   You may have a slight fever. Call the doctor if your fever is over 101 F (38.3 C) (taken under the tongue) or lasts longer than 24 hours.  You may have a dry mouth, a sore throat, muscle aches or trouble sleeping.  These should go away after 24 hours.  Do not make important or legal decisions.  6.   Do not drive or use heavy equipment.  If you have weakness or tingling, don't drive or use heavy equipment until this feeling goes away.                                                                                                                                                                           To contact a doctor, call    [phone]______________

## 2023-08-03 NOTE — ANESTHESIA PROCEDURE NOTES
Other (popliteal) Procedure Note    Pre-Procedure   Staff -        CRNA: Virginia Miles APRN CRNA       Performed By: CRNA       Location: pre-op       Procedure Start/Stop Times: 8/3/2023 11:49 AM and 8/3/2023 11:58 AM       Pre-Anesthestic Checklist: patient identified, IV checked, site marked, risks and benefits discussed, informed consent, monitors and equipment checked, pre-op evaluation, at physician/surgeon's request and post-op pain management  Timeout:       Correct Patient: Yes        Correct Procedure: Yes        Correct Site: Yes        Correct Position: Yes        Correct Laterality: Yes        Site Marked: Yes  Procedure Documentation  Procedure: Other (popliteal)       Diagnosis: RIGHT ANKLE FRACTURE       Laterality: right       Patient Position: supine       Patient Prep/Sterile Barriers: sterile gloves, mask       Skin prep: Chloraprep       Needle Type: insulated       Needle Gauge: 22.        Needle Length (Inches): 4        Ultrasound guided       1. Ultrasound was used to identify targeted nerve, plexus, vascular marker, or fascial plane and place a needle adjacent to it in real-time.       2. Ultrasound was used to visualize the spread of anesthetic in close proximity to the above referenced structure.       4. The visualized anatomic structures appeared normal.       5. There were no apparent abnormal pathologic findings.    Assessment/Narrative         The placement was negative for: blood aspirated, painful injection and site bleeding       Paresthesias: No.       Bolus given via needle..        Secured via.        Insertion/Infusion Method: Single Shot       Complications: none       Injection made incrementally with aspirations every 5 mL.    Medication(s) Administered   Bupivacaine 0.5% PF (Infiltration) - Infiltration   15 mL - 8/3/2023 11:58:00 AM  Dexamethasone 10 mg/mL PF (Perineural) - Perineural   5 mg - 8/3/2023 11:58:00 AM  Medication Administration Time: 8/3/2023 11:49 AM    "  Comments:  5 ml normal saline added to mixture  Picture from ultrasound did not capture      FOR [facility] ONLY:   Pain Team Contact information: please page the Pain Team Via [employer]. Search \"Pain\". During daytime hours, please page the attending first. At night please page the resident first.      "

## 2023-08-08 DIAGNOSIS — Z98.890 S/P ORIF (OPEN REDUCTION INTERNAL FIXATION) FRACTURE: Primary | ICD-10-CM

## 2023-08-08 DIAGNOSIS — Z87.81 S/P ORIF (OPEN REDUCTION INTERNAL FIXATION) FRACTURE: Primary | ICD-10-CM

## 2023-08-10 ENCOUNTER — HOSPITAL ENCOUNTER (OUTPATIENT)
Dept: GENERAL RADIOLOGY | Facility: OTHER | Age: 33
Discharge: HOME | End: 2023-08-10
Attending: PODIATRIST

## 2023-08-10 ENCOUNTER — OFFICE VISIT (OUTPATIENT)
Dept: ORTHOPEDICS | Facility: OTHER | Age: 33
End: 2023-08-10
Attending: PODIATRIST

## 2023-08-10 DIAGNOSIS — Z09 POSTOPERATIVE FOLLOW-UP: Primary | ICD-10-CM

## 2023-08-10 DIAGNOSIS — Z98.890 S/P ORIF (OPEN REDUCTION INTERNAL FIXATION) FRACTURE: ICD-10-CM

## 2023-08-10 DIAGNOSIS — Z87.81 S/P ORIF (OPEN REDUCTION INTERNAL FIXATION) FRACTURE: ICD-10-CM

## 2023-08-10 PROCEDURE — 73610 X-RAY EXAM OF ANKLE: CPT | Mod: RT

## 2023-08-10 PROCEDURE — 99024 POSTOP FOLLOW-UP VISIT: CPT | Performed by: PODIATRIST

## 2023-08-10 RX ORDER — CLINDAMYCIN HCL 300 MG
300 CAPSULE ORAL 3 TIMES DAILY
Qty: 21 CAPSULE | Refills: 0 | Status: SHIPPED | OUTPATIENT
Start: 2023-08-10 | End: 2023-08-17

## 2023-08-10 NOTE — PROGRESS NOTES
SUBJECTIVE:  Viridiana is here a week out from ORIF of fibula and syndesmosis repair.  He is doing relatively well does have some increased pain as the block wore off he denies systemic signs of infection.  Did have a little bit of irritation to the common peroneal from the splint.  But otherwise doing well.    ROS: Musculoskeletal and general review of systems are negative, per review of previous clinic questionnaire.  Denies SOB and calf pain.    EXAM: Does have significant swelling some mild erythema distal to the malleolus the incisions look quite good.  CMS is intact.    IMAGING: 3 views ankle demonstrate intact well-positioned hardware well aligned well-fixed ankle.    ASSESSMENT: Status post ORIF fibula and syndesmosis    PLAN: Discussed progression of treatment.  He was put in a cast he will stay in that cast for couple weeks.  Continue with baby aspirin twice a day.  He is a resident of [facility] and they were set to go back originally in a week there and hold off weeks we will see him back in a couple weeks to remove the cast get him into a boot and he will need to follow-up back home.    Justin High DPM

## 2023-08-23 DIAGNOSIS — Z98.890 S/P ORIF (OPEN REDUCTION INTERNAL FIXATION) FRACTURE: Primary | ICD-10-CM

## 2023-08-23 DIAGNOSIS — Z87.81 S/P ORIF (OPEN REDUCTION INTERNAL FIXATION) FRACTURE: Primary | ICD-10-CM

## 2023-08-24 ENCOUNTER — OFFICE VISIT (OUTPATIENT)
Dept: ORTHOPEDICS | Facility: OTHER | Age: 33
End: 2023-08-24
Attending: PODIATRIST

## 2023-08-24 ENCOUNTER — HOSPITAL ENCOUNTER (OUTPATIENT)
Dept: GENERAL RADIOLOGY | Facility: OTHER | Age: 33
Discharge: HOME | End: 2023-08-24
Attending: PODIATRIST

## 2023-08-24 DIAGNOSIS — Z87.81 S/P ORIF (OPEN REDUCTION INTERNAL FIXATION) FRACTURE: ICD-10-CM

## 2023-08-24 DIAGNOSIS — Z98.890 S/P ORIF (OPEN REDUCTION INTERNAL FIXATION) FRACTURE: ICD-10-CM

## 2023-08-24 DIAGNOSIS — Z09 POSTOPERATIVE FOLLOW-UP: Primary | ICD-10-CM

## 2023-08-24 PROCEDURE — 99024 POSTOP FOLLOW-UP VISIT: CPT | Performed by: PODIATRIST

## 2023-08-24 PROCEDURE — 73590 X-RAY EXAM OF LOWER LEG: CPT | Mod: RT

## 2023-08-24 NOTE — PROGRESS NOTES
SUBJECTIVE:  Follow-up of of the right ankle.  He is a status post ORIF of the right fibula and syndesmosis repair.  Patient states that he is doing quite well he has been nonweightbearing has been in a short leg nonweightbearing cast and using crutches to ambulate with.  He has no acute concerns today.    ROS: Musculoskeletal and general review of systems are negative, per review of previous clinic questionnaire.  Denies SOB and calf pain.    EXAM: Incisions are clean no signs of infection staples were removed and Steri-Strip.  He has moderate swelling in his foot and ankle.  He has some numbness on the dorsum of his foot.  He is able to do slight range of motion with dorsiflexion and plantarflexion.  He is able to wiggle his toes.  He denies calf pain.    IMAGIN views of the left fibula show well fixed well-maintained hardware in the mid third of the fibula with satisfactory alignment.  The mortise view is good alignment.    ASSESSMENT: Status post ORIF of the  right fibula with syndesmosis repair    PLAN: Continue to have him nonweightbearing at this time I discontinue the cast and placed him into a cam walking boot.  He will also have a compression sleeve.  He is to maintain nonweightbearing status and continue to elevate and ice he can take the boot off to work on dorsiflexion and plantarflexion.  He lives in [place] and will is leaving in the next day or 2 and he will be followed up down there for further orthopedic care.  They had no further questions.    Justin High DPM

## (undated) DEVICE — DRAPE STERI U 1015

## (undated) DEVICE — GEL ULTRASOUND AQUASONIC 20GM 01-01

## (undated) DEVICE — DRAPE C-ARM PACK 9"

## (undated) DEVICE — DRSG JUMPSTART ANTIMICROBIAL 1.5X8" ABS-4005

## (undated) DEVICE — NDL 22GA 1.5"

## (undated) DEVICE — SU VICRYL 2-0 SH 27" UND J417H

## (undated) DEVICE — GLOVE PROTEXIS PI ORTHO PF 7.5 2D73HT75

## (undated) DEVICE — GLOVE PROTEXIS PI ORTHO PF 8.5 2D73HT76

## (undated) DEVICE — SLEEVE COMPRESSION PLASTIC SCD FOOT MED 5897

## (undated) DEVICE — GLOVE BIOGEL INDICATOR 7.5 LF 41675

## (undated) DEVICE — DECANTER VIAL 2006S

## (undated) DEVICE — GLOVE BIOGEL PI INDICATOR 8.0 LF 41680

## (undated) DEVICE — CAST PADDING 4" COTTON WEBRIL UNSTERILE 9084

## (undated) DEVICE — PACK MAJOR EXTREMITY SOP15MEFCA

## (undated) DEVICE — IMPLANTABLE DEVICE
Type: IMPLANTABLE DEVICE | Site: ANKLE | Status: NON-FUNCTIONAL
Removed: 2023-08-03

## (undated) DEVICE — DRSG ABDOMINAL PAD UNSTERILE 5X9" 9190

## (undated) DEVICE — BNDG ELASTIC 4" DBL LENGTH UNSTERILE 6611-14

## (undated) DEVICE — SOL WATER 1500ML

## (undated) DEVICE — COVER LIGHT HANDLE LT-F02

## (undated) DEVICE — BLADE KNIFE SURG 15 371115

## (undated) DEVICE — DRILL BIT ARTHREX 2.5MM AR-8943-30

## (undated) DEVICE — PREP CHLORAPREP 26ML TINTED ORANGE  260815

## (undated) DEVICE — CAST PADDING 6" WEBRIL II UNSTERILE 4519

## (undated) DEVICE — DRSG GAUZE 4X4" TRAY 6939

## (undated) DEVICE — TOURNIQUET SGL  BLADDER 30"X4" BLUE 5921030135

## (undated) DEVICE — DRAPE C-ARMOR 5 SIDED 5523

## (undated) RX ORDER — FENTANYL CITRATE 50 UG/ML
INJECTION, SOLUTION INTRAMUSCULAR; INTRAVENOUS
Status: DISPENSED
Start: 2023-08-03

## (undated) RX ORDER — HYDROMORPHONE HYDROCHLORIDE 1 MG/ML
INJECTION, SOLUTION INTRAMUSCULAR; INTRAVENOUS; SUBCUTANEOUS
Status: DISPENSED
Start: 2023-08-03

## (undated) RX ORDER — PROPOFOL 10 MG/ML
INJECTION, EMULSION INTRAVENOUS
Status: DISPENSED
Start: 2023-08-03

## (undated) RX ORDER — CLINDAMYCIN PHOSPHATE 600 MG/50ML
INJECTION, SOLUTION INTRAVENOUS
Status: DISPENSED
Start: 2023-08-03

## (undated) RX ORDER — KETOROLAC TROMETHAMINE 30 MG/ML
INJECTION, SOLUTION INTRAMUSCULAR; INTRAVENOUS
Status: DISPENSED
Start: 2023-08-03

## (undated) RX ORDER — ACETAMINOPHEN 325 MG/1
TABLET ORAL
Status: DISPENSED
Start: 2023-08-03

## (undated) RX ORDER — CLINDAMYCIN PHOSPHATE 900 MG/50ML
INJECTION, SOLUTION INTRAVENOUS
Status: DISPENSED
Start: 2023-08-03

## (undated) RX ORDER — ONDANSETRON 2 MG/ML
INJECTION INTRAMUSCULAR; INTRAVENOUS
Status: DISPENSED
Start: 2023-08-03

## (undated) RX ORDER — BUPIVACAINE HYDROCHLORIDE 5 MG/ML
INJECTION, SOLUTION EPIDURAL; INTRACAUDAL
Status: DISPENSED
Start: 2023-08-03

## (undated) RX ORDER — DEXAMETHASONE SODIUM PHOSPHATE 10 MG/ML
INJECTION, SOLUTION INTRAMUSCULAR; INTRAVENOUS
Status: DISPENSED
Start: 2023-08-03

## (undated) RX ORDER — OXYCODONE HYDROCHLORIDE 5 MG/1
TABLET ORAL
Status: DISPENSED
Start: 2023-08-03